# Patient Record
Sex: FEMALE | Employment: UNEMPLOYED | URBAN - METROPOLITAN AREA
[De-identification: names, ages, dates, MRNs, and addresses within clinical notes are randomized per-mention and may not be internally consistent; named-entity substitution may affect disease eponyms.]

---

## 2022-11-28 ENCOUNTER — APPOINTMENT (OUTPATIENT)
Dept: CT IMAGING | Age: 62
End: 2022-11-28
Payer: MEDICAID

## 2022-11-28 ENCOUNTER — APPOINTMENT (OUTPATIENT)
Dept: GENERAL RADIOLOGY | Age: 62
End: 2022-11-28
Payer: MEDICAID

## 2022-11-28 ENCOUNTER — HOSPITAL ENCOUNTER (EMERGENCY)
Age: 62
Discharge: ANOTHER HOSPITAL | End: 2022-11-28
Attending: EMERGENCY MEDICINE
Payer: MEDICAID

## 2022-11-28 VITALS
RESPIRATION RATE: 21 BRPM | TEMPERATURE: 99 F | HEART RATE: 95 BPM | DIASTOLIC BLOOD PRESSURE: 77 MMHG | WEIGHT: 194.45 LBS | OXYGEN SATURATION: 96 % | SYSTOLIC BLOOD PRESSURE: 181 MMHG

## 2022-11-28 DIAGNOSIS — R55 SYNCOPE AND COLLAPSE: ICD-10-CM

## 2022-11-28 DIAGNOSIS — I60.9 SUBARACHNOID HEMORRHAGE (HCC): Primary | ICD-10-CM

## 2022-11-28 LAB
A/G RATIO: 1.1 (ref 1.1–2.2)
ALBUMIN SERPL-MCNC: 4 G/DL (ref 3.4–5)
ALP BLD-CCNC: 79 U/L (ref 40–129)
ALT SERPL-CCNC: 6 U/L (ref 10–40)
ANION GAP SERPL CALCULATED.3IONS-SCNC: 17 MMOL/L (ref 3–16)
AST SERPL-CCNC: 20 U/L (ref 15–37)
BACTERIA: ABNORMAL /HPF
BASOPHILS ABSOLUTE: 0 K/UL (ref 0–0.2)
BASOPHILS RELATIVE PERCENT: 0.5 %
BILIRUB SERPL-MCNC: 0.4 MG/DL (ref 0–1)
BILIRUBIN URINE: NEGATIVE
BLOOD, URINE: ABNORMAL
BUN BLDV-MCNC: 9 MG/DL (ref 7–20)
CALCIUM SERPL-MCNC: 9.5 MG/DL (ref 8.3–10.6)
CHLORIDE BLD-SCNC: 101 MMOL/L (ref 99–110)
CLARITY: ABNORMAL
CO2: 18 MMOL/L (ref 21–32)
COLOR: YELLOW
CREAT SERPL-MCNC: 0.6 MG/DL (ref 0.6–1.2)
EKG ATRIAL RATE: 59 BPM
EKG DIAGNOSIS: NORMAL
EKG P AXIS: 136 DEGREES
EKG P-R INTERVAL: 182 MS
EKG Q-T INTERVAL: 398 MS
EKG QRS DURATION: 88 MS
EKG QTC CALCULATION (BAZETT): 394 MS
EKG R AXIS: 135 DEGREES
EKG T AXIS: 144 DEGREES
EKG VENTRICULAR RATE: 59 BPM
EOSINOPHILS ABSOLUTE: 0.1 K/UL (ref 0–0.6)
EOSINOPHILS RELATIVE PERCENT: 2.1 %
EPITHELIAL CELLS, UA: ABNORMAL /HPF (ref 0–5)
GFR SERPL CREATININE-BSD FRML MDRD: >60 ML/MIN/{1.73_M2}
GLUCOSE BLD-MCNC: 88 MG/DL (ref 70–99)
GLUCOSE URINE: NEGATIVE MG/DL
HCT VFR BLD CALC: 44.1 % (ref 36–48)
HEMOGLOBIN: 14.2 G/DL (ref 12–16)
KETONES, URINE: NEGATIVE MG/DL
LEUKOCYTE ESTERASE, URINE: NEGATIVE
LYMPHOCYTES ABSOLUTE: 2.5 K/UL (ref 1–5.1)
LYMPHOCYTES RELATIVE PERCENT: 37.5 %
MCH RBC QN AUTO: 29.5 PG (ref 26–34)
MCHC RBC AUTO-ENTMCNC: 32.2 G/DL (ref 31–36)
MCV RBC AUTO: 91.7 FL (ref 80–100)
MICROSCOPIC EXAMINATION: YES
MONOCYTES ABSOLUTE: 0.5 K/UL (ref 0–1.3)
MONOCYTES RELATIVE PERCENT: 8 %
NEUTROPHILS ABSOLUTE: 3.4 K/UL (ref 1.7–7.7)
NEUTROPHILS RELATIVE PERCENT: 51.9 %
NITRITE, URINE: POSITIVE
PDW BLD-RTO: 15.4 % (ref 12.4–15.4)
PH UA: 7 (ref 5–8)
PLATELET # BLD: 224 K/UL (ref 135–450)
PMV BLD AUTO: 7.3 FL (ref 5–10.5)
POTASSIUM REFLEX MAGNESIUM: 4 MMOL/L (ref 3.5–5.1)
PROTEIN UA: NEGATIVE MG/DL
RBC # BLD: 4.81 M/UL (ref 4–5.2)
RBC UA: ABNORMAL /HPF (ref 0–4)
SODIUM BLD-SCNC: 136 MMOL/L (ref 136–145)
SPECIFIC GRAVITY UA: 1.02 (ref 1–1.03)
TOTAL PROTEIN: 7.7 G/DL (ref 6.4–8.2)
TRICHOMONAS: ABNORMAL /HPF
TROPONIN: <0.01 NG/ML
URINE REFLEX TO CULTURE: ABNORMAL
URINE TYPE: ABNORMAL
UROBILINOGEN, URINE: 0.2 E.U./DL
WBC # BLD: 6.6 K/UL (ref 4–11)
WBC UA: ABNORMAL /HPF (ref 0–5)

## 2022-11-28 PROCEDURE — 93010 ELECTROCARDIOGRAM REPORT: CPT | Performed by: INTERNAL MEDICINE

## 2022-11-28 PROCEDURE — 36415 COLL VENOUS BLD VENIPUNCTURE: CPT

## 2022-11-28 PROCEDURE — 81001 URINALYSIS AUTO W/SCOPE: CPT

## 2022-11-28 PROCEDURE — 2580000003 HC RX 258: Performed by: EMERGENCY MEDICINE

## 2022-11-28 PROCEDURE — 85025 COMPLETE CBC W/AUTO DIFF WBC: CPT

## 2022-11-28 PROCEDURE — 70450 CT HEAD/BRAIN W/O DYE: CPT

## 2022-11-28 PROCEDURE — 80053 COMPREHEN METABOLIC PANEL: CPT

## 2022-11-28 PROCEDURE — 2500000003 HC RX 250 WO HCPCS: Performed by: EMERGENCY MEDICINE

## 2022-11-28 PROCEDURE — 84484 ASSAY OF TROPONIN QUANT: CPT

## 2022-11-28 PROCEDURE — 96365 THER/PROPH/DIAG IV INF INIT: CPT

## 2022-11-28 PROCEDURE — 93005 ELECTROCARDIOGRAM TRACING: CPT | Performed by: EMERGENCY MEDICINE

## 2022-11-28 PROCEDURE — 96375 TX/PRO/DX INJ NEW DRUG ADDON: CPT

## 2022-11-28 PROCEDURE — 99285 EMERGENCY DEPT VISIT HI MDM: CPT

## 2022-11-28 PROCEDURE — 72125 CT NECK SPINE W/O DYE: CPT

## 2022-11-28 PROCEDURE — 6360000002 HC RX W HCPCS: Performed by: EMERGENCY MEDICINE

## 2022-11-28 PROCEDURE — 71045 X-RAY EXAM CHEST 1 VIEW: CPT

## 2022-11-28 RX ORDER — LABETALOL HYDROCHLORIDE 5 MG/ML
10 INJECTION, SOLUTION INTRAVENOUS ONCE
Status: DISCONTINUED | OUTPATIENT
Start: 2022-11-28 | End: 2022-11-28 | Stop reason: HOSPADM

## 2022-11-28 RX ADMIN — LEVETIRACETAM 1000 MG: 100 INJECTION, SOLUTION INTRAVENOUS at 10:31

## 2022-11-28 RX ADMIN — NICARDIPINE HYDROCHLORIDE 5 MG/HR: 0.1 INJECTION, SOLUTION INTRAVENTRICULAR at 10:34

## 2022-11-28 ASSESSMENT — PAIN SCALES - GENERAL: PAINLEVEL_OUTOF10: 8

## 2022-11-28 ASSESSMENT — PAIN DESCRIPTION - LOCATION: LOCATION: HEAD

## 2022-11-28 NOTE — ED NOTES
Patient accepted to [facility] Dr. Joellyn Osler. Room 7111.   Report [phone]     Alina Casas) Manuel Trevino RN  11/28/22 0111

## 2022-11-28 NOTE — ED PROVIDER NOTES
[facility] ENCOUNTER      Pt Name: Leighton Smith  MRN: [id number]  [place] 1960  Date of evaluation: 11/28/2022  Provider: Adrien Quinonez DO    CHIEF COMPLAINT       Chief Complaint   Patient presents with    Loss of Consciousness     Patient states she was talking to a family member while walking down the street, her head started to hurt and she fell down. States that she did not lose consciousness that she knew of. She states she heard questions paramedics were asking but couldn't answer. HISTORY OF PRESENT ILLNESS   (Location/Symptom, Timing/Onset, Context/Setting, Quality, Duration, Modifying Factors, Severity)  Note limiting factors. Leighton Smith is a 63 y.o. female who presents to the emergency department with a complaint of a dull aching headache in the right frontal area. She states that it started earlier this morning. It was gradual in onset. She states that she was talking to a family member, walking down the street, coming from the store, when she started to feel lightheaded and fell. She states that she did not lose consciousness. However, paramedics report that she was unresponsive on their arrival.  The patient states that she remembers what was happening but she was unable to answer them. She denies any prior history of syncope, palpitations, or arrhythmia. She denies any associated chest pain heaviness pressure or tightness. She denies any shortness of breath diaphoresis or dyspnea on exertion. She denies any recent illness fever chills cough cold symptoms. She denies any abdominal pain nausea vomiting diarrhea. She denies any melena or hematochezia. She denies any personal or family history of coronary artery disease or thromboembolic disease. She smokes tobacco.  She denies any drug or alcohol use. She denies any leg or calf pain. No recent travel.     She states that she feels better now but still has some mild dull throbbing headache in the right frontal area. She currently rates it an 8/10 intensity. She denies any associated vision change, vision loss, tinnitus, vertigo, neck pain, slurred speech, difficulty speaking, or difficulty walking. She denies any focal weakness or numbness. Nursing Notes were reviewed. HPI        REVIEW OF SYSTEMS    (2-9 systems for level 4, 10 or more for level 5)       Constitutional: Negative for fever or chills. HENT: Negative for rhinorrhea and sore throat. Eyes: Negative for redness or drainage. Respiratory: Negative for shortness of breath or dyspnea on exertion. Cardiovascular: Negative for chest pain. Gastrointestinal: Negative for abdominal pain. Negative for vomiting or diarrhea. Genitourinary: Negative for flank pain. Negative for dysuria. Negative for hematuria. Musculoskeletal:  Negative edema. Hematological: Negative for adenopathy. All systems are reviewed and are negative except for those listed above in the history of present illness and ROS. PAST MEDICAL HISTORY   No past medical history on file. SURGICAL HISTORY     No past surgical history on file. CURRENT MEDICATIONS       Previous Medications    No medications on file       ALLERGIES     Patient has no known allergies. FAMILY HISTORY     No family history on file. SOCIAL HISTORY       Social History     Socioeconomic History    Marital status: Single   Tobacco Use    Smoking status: Every Day     Types: Cigarettes   Substance and Sexual Activity    Drug use: Yes     Types: Marijuana (Weed)       SCREENINGS   NIH Stroke Scale  Interval: Baseline  Level of Consciousness (1a): Alert  LOC Questions (1b): Answers both correctly  LOC Commands (1c): Performs both tasks correctly  Best Gaze (2): Normal  Visual (3): No visual loss  Facial Palsy (4): Normal symmetrical movement  Motor Arm, Left (5a): No drift  Motor Arm, Right (5b):  No drift  Motor Leg, Left (6a): No drift  Motor Leg, Right (6b): No drift  Limb Ataxia (7): Absent  Sensory (8): Normal  Best Language (9): No aphasia  Dysarthria (10): Normal  Extinction and Inattention (11): No abnormality  Total: 0Glasgow Coma Scale  Eye Opening: Spontaneous  Best Verbal Response: Oriented  Best Motor Response: Obeys commands  John Coma Scale Score: 15        PHYSICAL EXAM    (up to 7 for level 4, 8 or more for level 5)     ED Triage Vitals   BP Temp Temp src Pulse Resp SpO2 Height Weight   -- -- -- -- -- -- -- --         Physical Exam   Constitutional: Awake and alert. Very pleasant. Mild discomfort. Head: No visible evidence of trauma. Normocephalic. No greene sign. No skull tenderness or step-off. Eyes: Pupils equal and reactive. No photophobia. Conjunctiva normal.    HENT: Oral mucosa moist.  Airway patent. Pharynx without erythema. Nares were clear. No intraoral or intranasal injury. No hyphema. Neck:  Soft and supple. Nontender. No point or axial tenderness. Mild discomfort with range of motion. Heart:  Regular rate and rhythm. No murmur. Lungs:  Clear to auscultation. No wheezes, rales, or ronchi. No conversational dyspnea or accessory muscle use. Chest: Chest wall non-tender. No evidence of trauma. Abdomen:  Soft, nondistended, bowel sounds present. Nontender. No guarding rigidity or rebound. No masses. Musculoskeletal: Extremities non-tender with full range of motion. Radial and dorsalis pedis pulses were intact. No calf tenderness erythema or edema. Neurological: Alert and oriented x3. GCS 15. Cranial nerves II through XII were intact. No facial droop. No dysarthria. No aphasia. No pronator drift. No acute focal motor or sensory deficits. Negative finger-to-nose. Negative heel-to-shin. Deep tendon reflexes were 2/4 in the upper and lower extremities bilaterally. Gait steady. No visual field deficits. NIH stroke scale is 0. Skin: Skin is warm and dry. No rash. Lymphatic:  No lympadenopathy. Psychiatric: Normal mood and affect. Behavior is normal.         DIAGNOSTIC RESULTS     EKG: All EKG's are interpreted by the Emergency Department Physician who either signs or Co-signs this chart in the absence of a cardiologist.    Sinus bradycardia. Rate 59. KY interval 182 ms. QRS duration 88 ms. QTc 394 ms. R Axis I 135 degrees. There is no ST elevation. T wave inversion noted in V1. Slight J-point elevation noted in V2. No reciprocal changes. Left ventricular hypertrophy. RADIOLOGY:   Non-plain film images such as CT, Ultrasound and MRI are read by the radiologist. Plain radiographic images are visualized and preliminarily interpreted by the emergency physician with the below findings:        Interpretation per the Radiologist below, if available at the time of this note:    CTA HEAD NECK W CONTRAST         XR CHEST PORTABLE   Final Result   No radiographic evidence of acute pulmonary disease. CT CERVICAL SPINE WO CONTRAST   Final Result   No acute abnormality of the cervical spine. CT HEAD WO CONTRAST   Final Result   1. Acute subarachnoid hemorrhage. 2. There is no pattern of significant edema or mass effect at the current   time. Findings were discussed with MRAQUIS EDWARDS at 9:53 am on 11/28/2022. ED BEDSIDE ULTRASOUND:   Performed by ED Physician - none    LABS:  Labs Reviewed   COMPREHENSIVE METABOLIC PANEL W/ REFLEX TO MG FOR LOW K - Abnormal; Notable for the following components:       Result Value    CO2 18 (*)     Anion Gap 17 (*)     ALT 6 (*)     All other components within normal limits   CBC WITH AUTO DIFFERENTIAL   TROPONIN   URINALYSIS WITH REFLEX TO CULTURE   PROTIME-INR       All other labs were within normal range or not returned as of this dictation.     EMERGENCY DEPARTMENT COURSE and DIFFERENTIAL DIAGNOSIS/MDM:   Vitals:    Vitals:    11/28/22 0925 11/28/22 0945 11/28/22 1015 11/28/22 1030   BP: (!) 166/100 (!) 208/98 (!) 200/101 (!) 201/99   Pulse: 65 63 60 65   Resp: 16 15 11 15   Temp: 99 °F (37.2 °C)      TempSrc: Oral      SpO2: 95% 97% 99% 98%   Weight: 194 lb 7.1 oz (88.2 kg)            MDM        The patient presents with a syncopal episode that occurred prior to arrival while walking down the street. This was preceded by a dull gradual onset right frontal \"throbbing\" headache that began earlier this morning. She is neurologically intact. GCS is 15. NIH stroke scale is 0. She currently only complains of a slight headache. She is afebrile. She is hemodynamically stable. CT head and cervical spine were obtained. EKG reveals sinus rhythm. No acute changes. Is this patient to be included in the SEP-1 Core Measure due to severe sepsis or septic shock? No   Exclusion criteria - the patient is NOT to be included for SEP-1 Core Measure due to: Infection is not suspected      REASSESSMENT          10:01 AM: Critical CT report was called by radiology. The patient has evidence of acute subarachnoid hemorrhage. Call was placed to the transfer center to neurosurgery on-call. Given the fact that the patient's headache began prior to her fall, I do not suspect that this is traumatic in origin. CTA was added. She is NPO. She was given Keppra 1000 mg IV. She was given labetalol 10 mg IV. Blood pressure is 166/100. The transfer center spoke with Tyrone Red, nurse practitioner for [address] [place] [address] neurosurgery. Based on the nature of this patient's symptoms, she will need to go to [facility].  Call was placed to neurosurgery at [facility].    10:20 AM: Findings were discussed with the patient at the bedside and her questions were answered. Neurological exam is unchanged. She is alert and oriented x3. GCS is 15. No motor or sensory deficits. She still complains of a mild headache. No neck pain. Blood pressure however is 200/100. Cardene drip was initiated.   Titrate

## 2022-11-28 NOTE — ED NOTES
Patient arrived by [facility] who report finding patient on ground alert but not answering questions. Upon arrival to ED patient alert and oriented. Keeps eyes closed when answering questions. States she was walking the kids to school and left dizzy. States she fell to ground but does not remember hitting her head. States she did not lose consciousness. C/o pain top of head.      [facility] ([facility]) Marely Martinez RN  11/28/22 6778

## 2022-11-28 NOTE — ED NOTES
Voided 200 ml per bedpan. [employer] team here.   Report transported to [facility] Neuro Unit room [address] [facility] Yanci Love RN  11/28/22 5720

## 2022-11-28 NOTE — PLAN OF CARE
NEUROSURGERY PLAN OF CARE NOTE    Roberto Carlos Kent  [id number]   1960   11/28/2022    ED physician: Al Thompson    Reason for call: [address]    History of present illness: Patient is a 63 y.o. female that  has no past medical history on file. Patient presented on 11/28/2022 to [facility] ED. According to  [facility], the patient states the headache started earlier this morning and was gradual in onset. She states she was talking to a family member, walking down the street, coming from the store, when she started to feel lightheaded and fell. She states she did not lose consciousness, but paramedics report she was unresponsive upon their arrival.  The patient states she remembers what was happening but she was unable to answer them. She denies any prior history of syncope, palpitations, or arrhythmia. She denies any associated chest pain heaviness pressure or tightness. She denies any shortness of breath diaphoresis or dyspnea on exertion. She states she feels better now but still has some mild dull throbbing headache in the right frontal area. She currently rates it an 8/10 intensity. She denies any associated vision change, vision loss, tinnitus, vertigo, neck pain, slurred speech, difficulty speaking, or difficulty walking. She denies any focal weakness or numbness. Radiological Findings:  CT HEAD WO CONTRAST  Result Date: 11/28/2022  1. Acute subarachnoid hemorrhage. 2. There is no pattern of significant edema or mass effect at the current time. Assessment:  Patient is a 63 y.o. y/o female w/LOC and found unresponsive by EMS and continues to have 8/10 headache. Recommendations:  Transfer to [facility] Neuro ICU  Dr. Damion Hammonds from [address] [place] [address] Neurovascular Surgery notified of patient's transfer  Neurologic exams frequency:  - ICU: Q1H  For change in exam MUST contact neurosurgery team  CTA Head/Neck to look for aneurysmal etiology of SAH  Seizure prophylaxis: Keppra 1000 mg   Maintain SBP <160;  If PRN med insufficient, then may start Nicardipine infusion  Keep Plt >100k & INR <1.4    DISPO: Transfer to [facility]      Electronically signed by: FLORENTINO Blas CNP, APRN-CNP, 11/28/2022 10:09 AM  [phone]    .

## 2023-01-05 ENCOUNTER — HOSPITAL ENCOUNTER (OUTPATIENT)
Dept: PHYSICAL THERAPY | Age: 63
Setting detail: THERAPIES SERIES
Discharge: HOME | End: 2023-01-05

## 2023-01-05 NOTE — PROGRESS NOTES
Physical Therapy  Cancellation/No-show Note  Patient Name:  Bhavin Landeros  :  1960   Date:  2023  Cancelled visits to date: 0  No-shows to date: 0    For today's appointment patient:  []  Cancelled  []  Rescheduled appointment  [x]  No-show     Reason given by patient:  []  Patient ill  []  Conflicting appointment  [x]  No transportation     []  Conflict with work  []  No reason given  []  Other:     Comments:  Call placed to patient after 15 minutes, transportation conflict this date.      Electronically signed by:  Sharita Hogan, [facility], DPT [id number] 1:24 PM

## 2023-01-12 ENCOUNTER — HOSPITAL ENCOUNTER (OUTPATIENT)
Dept: PHYSICAL THERAPY | Age: 63
Setting detail: THERAPIES SERIES
Discharge: HOME | End: 2023-01-12
Payer: MEDICAID

## 2023-01-12 PROCEDURE — 97112 NEUROMUSCULAR REEDUCATION: CPT

## 2023-01-12 PROCEDURE — 97530 THERAPEUTIC ACTIVITIES: CPT

## 2023-01-12 PROCEDURE — 97161 PT EVAL LOW COMPLEX 20 MIN: CPT

## 2023-01-12 NOTE — PROGRESS NOTES
Physical Therapy    Physical Therapy: Initial Evaluation    Patient: Cammie Jones (62 y.o. female)   Examination Date: 2023  Plan of Care Certification Period:2023 to        :  1960 ;    Confirmed: Yes MRN: [id number]  CSN: [id number]   Insurance: Payor: [facility] MEDICAID / Plan: [facility] MEDICA / Product Type: *No Product type* /   Insurance ID: [id number] - (Medicaid Managed) Secondary Insurance (if applicable):    Referring Physician: Wanda Singh MD Srilakshmi Pisati, MD   PCP: Wanda Singh MD Visits to Date/Visits Approved:     No Show/Cancelled Appts:  1 /       Medical Diagnosis: Hypertension, unspecified type [I10]  Screen for colon cancer [Z12.11]  Cerebrovascular accident (CVA), unspecified mechanism (HCC) [I63.9] Cerebrovascular accident (CVA), unspecified mechanism (HCC) (I63.9)  Treatment Diagnosis: Bilateral hip weakness, balance impairments resulting in gait deficits     PERTINENT MEDICAL HISTORY   Patient Assessed for Rehabilitation Services: Yes  Self reported health status:: Fair    Medical History: Chart Reviewed: Yes  No past medical history on file.  Surgical History: No past surgical history on file.    Medications:   Current Outpatient Medications:     atorvastatin (LIPITOR) 40 MG tablet, Take 1 tablet by mouth daily, Disp: 30 tablet, Rfl: 3    acetaminophen (TYLENOL) 500 MG tablet, Take 1 tablet by mouth 4 times daily as needed for Pain, Disp: 120 tablet, Rfl: 5    NIFEdipine (PROCARDIA XL) 30 MG extended release tablet, Take 1 tablet by mouth daily, Disp: 30 tablet, Rfl: 3  Allergies: Patient has no known allergies.       SUBJECTIVE EXAMINATION     History obtained from:: Patient, Chart Review,      Family/Caregiver Present: Yes (dtr transport daugther present for transportation)    Subjective History:Onset Date: 23  Pt reports occassional pain with L hip/abdomen but only occassional, denies any currently. Pt  agreeable to PT evaluation. Additional Pertinent Hx (if applicable): \"The patient is a(n) 63year old female who was admitted for subarachnoid hemorrhage on 11/28/2022. Patient was found to have a syncopal episode after experiencing severe headache and dizziness. According to the patient she had been experiencing headache for several days prior to admission. Blood pressure in the emergency department initially was 200/100. Patient was started on IV nicardipine. CT head revealed diffuse subarachnoid hemorrhage throughout the central cerebral sulcal and basilar cisterns. Subarachnoid hemorrhage was secondary to 5 mm acomm aneurysm with noted stenosis of left distal M1. . Patient underwent [address] and transcatheter embolization of left ICA. Post-procedure patient was on IV heparin drip per Neurosurgery protocol. Patient also was treated with Keppra 500 mg twice daily for seizure prophylaxis for 5 days. Patient was administered nimodipine for potential vasospasm hospitalization and discharged with prescription however is not required to continue for full 21 days as no evidence of vasospasm and subarachnoid hemorrhage per CT head resolved. \"  Ryan Downs CNP - 12/09/2022 10:57 AM EST). . One fall ~ 2 weeks from PT OP evaluation tripping over toy, denies any injury     Previous treatments prior to current episode?:  ([facility])       Learning/Language: Learning  Does the patient/guardian have any barriers to learning?: No barriers  Will there be a co-learner?: No     Pain Screening    Pain Screening  Patient Currently in Pain: Denies    Functional Status    Dominant Hand: : Right    Social History:    Social History  Lives With: Family  Type of Home: House  Home Layout: Able to Live on Main level with bedroom/bathroom, Multi-level  Home Access: Stairs to enter without rails  Entrance Stairs - Number of Steps: 10 (first set unilateral, bilateral second sets)  Home Equipment: Angelo    Occupation/Interests:  Occupation: Unemployed  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 ([address] and patient derick live in home)    Prior Level of Function:    Independent   Self Care: Independent/No Functional Limitation  Bed Mobility: Independent/No Functional Limitation  Transfers (sit to stand): Independent/No Functional Limitation  Transfers (stand to sit):  Independent/No Functional Limitation  Walking: Required increased time, Modified Independent  Stairs: Required increased time, Modified Independent    Current Level of Function:         ADL Assistance: Independent  Homemaking Assistance: Independent  Ambulation Assistance: Independent (cane outside home community, walks 3 hills each way <-> grand kids.)  Transfer Assistance: Independent  Active : No  Mode of Transportation: Family    OBJECTIVE EXAMINATION   Restrictions:  Restrictions/Precautions: None           Review of Systems:  Vision: Within Functional Limits  Visual Deficits: Near-sighted (use to wear glasses but does not wear any more)  Hearing: Within functional limits  Overall Orientation Status: Within Functional Limits  Follows Commands: Within Functional Limits    Observations:       Mobility:   Bed mobility  Rolling to Left: Modified independent  Rolling to Right: Modified independent  Supine to Sit: Modified independent  Sit to Supine: Modified independent  Transfers  Sit to Stand: Modified independent  Stand to Sit: Modified independent    Ambulation/Gait (if applicable):  Ambulation  Surface: Level tile, Carpet  Device: No Device  Assistance: Modified Independent  Quality of Gait: occassional decreased clearance of LLE in swing phase, slight hip trendelenburg in LLE stance phase  Gait Deviations: Deviated path  Distance: 950' (6 minute walk test multiple turns), multiple short distance throughout therapy office  Comments: without LOB  Stairs/Curb  Stairs?: Yes  Stairs  # Steps : 12  Stairs Height: 6\"  Rails: Left ascending  Device: No Device  Assistance: Modified independent   Comment: Ascending/descend reciprocal pattern, slight decreasd control with descent but without incident, ascending increased effort and time especially when leading with LLE but without LOB      Neuro Screen:    Sensation      Sensation  Overall Sensation Status: WFL (light touch WNL equal bilateral)       Quality of Movement     Tone  RLE Tone: Normotonic  LLE Tone: Normotonic Motor Control  Gross Motor?: WFL         ROM:   Left AROM  Right AROM         AROM LLE (degrees)  LLE AROM : WFL    AROM RLE (degrees)  RLE AROM: WFL       Left Strength  Right Strength      Strength Other  Other: Side lying bilateral hip abduction 3+/5, supine SLR hip flexion 3+/5 bilateral  Other: Side lying bilateral hip abduction 3+/5, supine SLR hip flexion 3+/5 bilateral  Strength LLE  Strength LLE: WFL  L Hip Flexion: 3+/5  L Knee Flexion: 4/5  L Knee Extension: 4/5  L Ankle Dorsiflexion: 4+/5  L Ankle Plantar Flexion: 4+/5 Strength Other  Other: Side lying bilateral hip abduction 3+/5, supine SLR hip flexion 3+/5 bilateral  Other: Side lying bilateral hip abduction 3+/5, supine SLR hip flexion 3+/5 bilateral  Strength RLE  Strength RLE: WFL  Comment: MMT screened in sitting supported chair. R Hip Flexion: 4-/5  R Knee Flexion: 4+/5  R Knee Extension: 4+/5  R Ankle Dorsiflexion: 5/5  R Ankle Plantar flexion: 5/5     Additional Spinal Strength (if applicable): Other: Side lying bilateral hip abduction 3+/5, supine SLR hip flexion 3+/5 bilateral    Additional Finding(s) (if applicable):      Balance/Gait Assessment(s) Performed:  Maybelle Naranjo Balance Scale   1. Sitting to Standing: Able to stand without using hands and stabilize independently  2. Standing Unsupported: Able to stand safely for 2 minutes  3. Sitting with Back Unsupported but Feet Supported on Floor or on a Stool: Able to sit safely and securely for 2 minutes  4.  Standing to Sitting: Sits safely with minimal use of hands  5. Transfers: Able to transfer safely with minor use of hands  6. Standing Unsupported with Eyes Closed: Able to stand 10 seconds safely  7. Standing Unsupported with Feet Together: Able to place feet together independently and stand 1 minute safely  8. Reach Forward with Outstretched Arm While Standing: Can reach forward confidently 25 cm (10 inches)  9.  Object from Floor from a Standing Position: Able to  slipper safely and easily  10. Turning to Look Behind Over Left and Right Shoulders While Standing: Looks behind from both sides and weight shifts well  11. Turn 360 Degrees: Able to turn 360 degrees safely in 4 seconds or less  12. Place Alternate Foot on Step or Stool While Standing Unsupported: Able to stand independently and safely and complete 8 steps in 20 seconds  13. Standing Unsupported One Foot in Front: Able to place foot tandem independently and hold 30 seconds  14. Standing on One Leg: Tries to lift leg unable to hold 3 seconds but remains standing independently  Montalvo Balance Score: 53  Montalvo Balance Disability Index: 1-19%  Montalvo CMS Modifier: CI     Current Score: 53 / 56 (Date: 1/12/2023)    Interpretation of Score: Each section is scored on a 0-4 scale, 0 representing the patient's inability to perform the task and 4 representing independence. The scores of each section are added together for a total score of 56. The higher the patient's score, the more independent the patient. Any score below 45 indicates increased risk for falls. Low risk for falls (41-46), medium risk for falls (21-40), and high risk for falls (0-20). 6 Minute Walk Test     Distance: 950 ft.; 289.56 meters    Outcome Measure(s) Completed:   Not Assessed     ASSESSMENT     Impression:Assessment: Pt 64 y/o female s/p subarachnoid hemorrhage on 11/28/2022. Patient was found to have a syncopal episode after experiencing severe headache and dizziness.  According to the patient she had been experiencing headache for several days prior to admission. Blood pressure in the emergency department initially was 200/100. Patient was started on IV nicardipine. CT head revealed diffuse subarachnoid hemorrhage throughout the central cerebral sulcal and basilar cisterns. Subarachnoid hemorrhage was secondary to 5 mm acomm aneurysm with noted stenosis of left distal M1. . Patient underwent [address] and transcatheter embolization of left ICA. Patient reports some weakness with L hip, balance impairments with one fall over object at home since hospital admission. This date patient present with 53/56 COLON balance scale slight fall risk, completed 950' with 6 minute walk test, and decreased strength with bilateral hips resulting in slight gait impairments. Pt benefit from outpatient PT 1 X per week for 3-4 weeks to promote improvement of balance and gereralized BLEs strengthening to decreased risk for fall. Body Structures, Functions, Activity Limitations Requiring Skilled Therapeutic Intervention: Decreased functional mobility , Decreased strength, Decreased endurance    Statement of Medical Necessity: Physical Therapy is both indicated and medically necessary as outlined in the POC to increase the likelihood of meeting the functionally related goals stated below. Patient's Activity Tolerance: Patient tolerated evaluation without incident, Patient tolerated treatment well        Patient's rehabilitation potential/prognosis is considered to be: Good    Factors which may impact rehabilitation potential include: None  Measures taken to address barrier(s): N/A     GOALS     Patient Goal(s): \"return to normale activity, balance better\"  Short Term Goals Completed by 1-2 week Goal Status   Pt will demonstrate independent with HEP promoting improve strength and balance promoting improved functional mobility safety. New   Pt will demonstrated improved 6 minute walker test > 1000' to promote return to community ambulation. New                                                       Long Term Goals Completed by 3-4 weeks Goal Status   Montalvo balance 56/56 to promote decreased risk of falls for functional mobility tasks. New   Improve hip strength 4+/5 to improve stabilization and tolerance of ambulation community distances.  New   Ambulate at least 1500' outside over uneven surfaces to facilitate improved independence with community mobility New                                                  TREATMENT PLAN   PT Equipment Recommendations  Equipment Needed: No   Requires PT Follow-Up: Yes  Treatment Initiated : 1/12/2023    Pt. actively involved in establishing Plan of Care and Goals: Yes  Patient/ Caregiver education and instruction:Goals, Plan of Care, Orientation, Transfer Training, Gait Training, Home Exercise Program, Family Education, Equipment, Posture, Functional Mobility Training, Body mechanics, PT Role, Fall prevention strategies, Disease Specific Education HEP, benefits of continues PT for strengthening and balance           Treatment may include any combination of the following: Current Treatment Recommendations: Strengthening, Balance training, Functional mobility training, Transfer training, Endurance training, Gait training, Stair training, Neuromuscular re-education, Home exercise program, Safety education & training, Patient/Caregiver education & training, Equipment evaluation, education, & procurement     Frequency / Duration:  Patient to be seen 1 X per week for 3-4 weeks       Eval Complexity: Overall Evaluation : Low  Decision Making: Low Complexity  History: Personal Factors and/or Comorbidities Impacting POC: Low  Examination of body system(s) including body structures and functions, activity limitations, and/or participation restrictions: Low  Clinical Presentation: Low  Clinical Decision Making : Low Complexity    PT Treatment Completed:  Exercises:  Therapeutic exercise (CPT [id number])   Treatment Reasoning Exercise 1: Mini squats BLEs with counter support X 10  Exercise 2: Hip abduction knee striahgt BLEs with counter support x10 each alternating  Exercise 3: Standing marching with counter support BLEs each alteranting X 10  Exercise 4: standing hip extension with counter support X 10 each alteranting  Exercise 5: Standing hip flexion with knee straight counter support X 10 each alteranting    Limitations addressed: Strength, Balance, Activity tolerance  Limitations addressed: posture during exercises at this time, strength of bilateral hips. Therapist provided: Verbal cuing  Therapist provided: [email or web address]    Access Code: [email or web address]   1:1 Time (minutes): 15                 Neuromuscular Re-Education: (CPT [id number]) Treatment Reasoning    Neuromuscular Re-education (CPT 53836)  Neuro Re-Ed Exercise 1: Standing at counter SLS 15\" hold bilateral, no LOB but requires single UE hold. Neuro Re-Ed Exercise 2: Standing tandem stance level surface 30\" each side without UE support, slight hip sway but without lOB Limitations addressed: Balance, Posture  Therapist provided: Verbal cuing   1:1 Time (minutes): 8                 Therapy Time  Individual Time In: 1257       Individual Time Out: 1350  Minutes: 53  Timed Code Treatment Minutes: 23 Minutes     Therapist Signature: Vanita Giraldo, PT    Date: 1/12/2023   Isis Alvarez PT, DPT [id number] 53/70/02 2:01 PM    I certify that the above Therapy Services are being furnished while the patient is under my care. I agree with the treatment plan and certify that this therapy is necessary. Physician's Signature:  ___________________________   Date:_______                                                                   Zo Hough MD        Physician Comments: _______________________________________________    Please sign and return to [place] [address]. Please fax to the location listed below.  Jason [address] YOU for this referral!    [address] [facility]  [address] [place] [address]  Dept: [phone]  Loc: [phone]       POC NOTE

## 2023-01-12 NOTE — PLAN OF CARE
[facility]  [address] [address]  Dept: [phone]  Loc: [phone]      Physical Therapy Re-Certification Plan of Care/MD UPDATE      Dear Prince Rashid MD,    We had the pleasure of treating the following patient for physical therapy services at [facility]. A summary of our findings can be found in the updated assessment below. This includes our plan of care. If you have any questions or concerns regarding these findings, please do not hesitate to contact me at the office phone number checked above. Thank you for the referral.     Physician Signature:________________________________Date:__________________  By signing above (or electronic signature), therapists plan is approved by physician    Date Range Of Visits: 2023  Total Visits to Date: 1  Overall Response to Treatment:   []Patient is responding well to treatment and improvement is noted with regards  to goals   []Patient should continue to improve in reasonable time if they continue HEP   []Patient has plateaued and is no longer responding to skilled PT intervention    []Patient is getting worse and would benefit from return to referring MD   []Patient unable to adhere to initial POC   [x]Other: POC initiated, too soon to determine progress      Recommendation:    [x]Continue PT 1x / wk for 3-4 weeks.     []Hold PT, pending MD visit            PHYSICAL THERAPY PLAN OF CARE   Patient: Dalia Shin (20 y.o. female)   Examination Date:   Plan of Care Certification Period: 2023 to        :  1960  MRN: [id number]  CSN: [id number]   Insurance: Payor: Gracia Cordova / Plan: Maria Esther Arora / Product Type: *No Product type* /   Insurance ID: [id number] - (Medicaid Managed) Secondary Insurance (if applicable):    Referring Physician: MD Rolo Melendez MD   PCP: Alberto Clancy Ronda Wood MD Visits to Date/Visits Approved: 1 / 30    No Show/Cancelled Appts:   /       Medical Diagnosis: Hypertension, unspecified type [I10]  Screen for colon cancer [Z12.11]  Cerebrovascular accident (CVA), unspecified mechanism ([facility] [I63.9] Cerebrovascular accident (CVA), unspecified mechanism ([address] (I63.9)  Treatment Diagnosis: Bilateral hip weakness, balance impairments resulting in gait deficits       ASSESSMENT     Impression:Assessment: Pt 62 y/o female s/p subarachnoid hemorrhage on 11/28/2022. Patient was found to have a syncopal episode after experiencing severe headache and dizziness. According to the patient she had been experiencing headache for several days prior to admission. Blood pressure in the emergency department initially was 200/100. Patient was started on IV nicardipine. CT head revealed diffuse subarachnoid hemorrhage throughout the central cerebral sulcal and basilar cisterns. Subarachnoid hemorrhage was secondary to 5 mm acomm aneurysm with noted stenosis of left distal M1. . Patient underwent [address] and transcatheter embolization of left ICA. Patient reports some weakness with L hip, balance impairments with one fall over object at home since hospital admission. This date patient present with 53/56 COLON balance scale slight fall risk, completed 950' with 6 minute walk test, and decreased strength with bilateral hips resulting in slight gait impairments. Pt benefit from outpatient PT 1 X per week for 3-4 weeks to promote improvement of balance and gereralized BLEs strengthening to decreased risk for fall. Body Structures, Functions, Activity Limitations Requiring Skilled Therapeutic Intervention: Decreased functional mobility , Decreased strength, Decreased endurance    Statement of Medical Necessity: Physical Therapy is both indicated and medically necessary as outlined in the POC to increase the likelihood of meeting the functionally related goals stated below.      Patient's Activity Tolerance: Patient tolerated evaluation without incident, Patient tolerated treatment well        Patient's rehabilitation potential/prognosis is considered to be: Good    Factors which may impact rehabilitation potential include: None  Measures taken to address barrier(s): N/A     GOALS     Patient Goal(s): \"return to normale activity, balance better\"  Short Term Goals Completed by 1-2 week Goal Status   Pt will demonstrate independent with HEP promoting improve strength and balance promoting improved functional mobility safety. New   Pt will demonstrated improved 6 minute walker test > 1000' to promote return to community ambulation. New                                                       Long Term Goals Completed by 3-4 weeks Goal Status   Montalvo balance 56/56 to promote decreased risk of falls for functional mobility tasks. New   Improve hip strength 4+/5 to improve stabilization and tolerance of ambulation community distances.  New   Ambulate at least 1500' outside over uneven surfaces to facilitate improved independence with community mobility New                                                  TREATMENT PLAN   PT Equipment Recommendations  Equipment Needed: No   Requires PT Follow-Up: Yes  Treatment Initiated : 1/12/2023    Pt. actively involved in establishing Plan of Care and Goals: Yes  Patient/ Caregiver education and instruction:Goals, Plan of Care, Orientation, Transfer Training, Gait Training, Home Exercise Program, Family Education, Equipment, Posture, Functional Mobility Training, Body mechanics, PT Role, Fall prevention strategies, Disease Specific Education HEP, benefits of continues PT for strengthening and balance           Treatment may include any combination of the following: Current Treatment Recommendations: Strengthening, Balance training, Functional mobility training, Transfer training, Endurance training, Gait training, Stair training, Neuromuscular re-education, Home exercise program, Safety education & training, Patient/Caregiver education & training, Equipment evaluation, education, & procurement     Frequency / Duration:  Patient to be seen 1 X per week for 3-4 weeks       Patient Status: [x] Continue / Initiate Plan of Care     Signature: Electronically signed by Yariel Longoria, PT on 1/12/2023 at 2:19 PM.  Dex Mcclelland PT, DPT [id number] 01/12/23 2:20 PM       If you have any questions or concerns, please don't hesitate to call.   Thank you for your referral!

## 2023-01-19 ENCOUNTER — HOSPITAL ENCOUNTER (OUTPATIENT)
Dept: PHYSICAL THERAPY | Age: 63
Setting detail: THERAPIES SERIES
Discharge: HOME | End: 2023-01-19
Payer: MEDICAID

## 2023-01-19 PROCEDURE — 97530 THERAPEUTIC ACTIVITIES: CPT

## 2023-01-19 NOTE — PROGRESS NOTES
Physical Therapy  Cancellation/No-show Note  Patient Name:  Jaswant Preston  :  1960   Date:  2023  Cancelled visits to date: 0  No-shows to date: 2    For today's appointment patient:  []  Cancelled  []  Rescheduled appointment  [x]  No-show     Reason given by patient:  [x]  Patient ill  []  Conflicting appointment  []  No transportation    []  Conflict with work  []  No reason given  []  Other:     Comments:  Called patient after 15 minutes no show, states that she is ill this date.  Plans to attend next scheduled appointment  at 1 pm.     Electronically signed by:  Amber Cantor, [address] [place] [address] PT, DPT [id number] 23 1:24 PM

## 2023-01-26 ENCOUNTER — HOSPITAL ENCOUNTER (OUTPATIENT)
Dept: PHYSICAL THERAPY | Age: 63
Setting detail: THERAPIES SERIES
Discharge: HOME | End: 2023-01-26
Payer: MEDICAID

## 2023-01-26 NOTE — PROGRESS NOTES
Physical Therapy  Cancellation/No-show Note  Patient Name:  Nereyda Rangel  :  1960   Date:  2023  Cancelled visits to date: 1  No-shows to date: 2    For today's appointment patient:  [x]  Cancelled  []  Rescheduled appointment  []  No-show     Reason given by patient:  [x]  Patient ill  []  Conflicting appointment  []  No transportation    []  Conflict with work  []  No reason given  []  Other:     Comments:  Pt reports ill with \"pneumonia or some kind of pneumonia\". Pt clarified that she does want to participate in therapy. Educated on importance of participating with one additional attempt offered.      Electronically signed by:  Cristiana Combs [address] [place] [address], DPT [id number] 23 12:15 PM

## 2023-02-02 ENCOUNTER — HOSPITAL ENCOUNTER (OUTPATIENT)
Dept: PHYSICAL THERAPY | Age: 63
Setting detail: THERAPIES SERIES
Discharge: HOME | End: 2023-02-02
Payer: MEDICAID

## 2023-02-02 PROCEDURE — 97110 THERAPEUTIC EXERCISES: CPT

## 2023-02-02 PROCEDURE — 97112 NEUROMUSCULAR REEDUCATION: CPT

## 2023-02-02 NOTE — PROGRESS NOTES
Physical Therapy: Daily Note   Patient: Sohan Hopkins (70 y.o. female)   Examination Date: 2023  No data recorded  No data recorded   :  1960 # of Visits since [facility]:   3   MRN: [id number]  CSN: [id number] Start of Care Date:   2023   Insurance: Payor: [employer] / Plan: Davon Crocsleeann / Product Type: *No Product type* /   Insurance ID: [id number] - (Medicaid Managed) Secondary Insurance (if applicable):    Referring Physician: MD Donya Soares MD   PCP: Donya Gómez MD Visits to Date/Visits Approved:     No Show/Cancelled Appts:          Medical Diagnosis: Hypertension, unspecified type [I10]  Screen for colon cancer [Z12.11]  Cerebrovascular accident (CVA), unspecified mechanism ([address] [I63.9] Cerebrovascular accident (CVA), unspecified mechanism ([address] (I63.9)  Treatment Diagnosis: Bilateral hip weakness, balance impairments resulting in gait deficits        SUBJECTIVE EXAMINATION   Pain Level:      Patient Comments: Subjective: Pt reports no pain this date, reports improved activity tolerance this date. No new concerns but feeling stronger. Pt agreeable to PT treatment session.     HEP Compliance: Good        OBJECTIVE EXAMINATION   Restrictions:  Restrictions/Precautions: None   No data recorded No data recorded              TREATMENT     Exercises:  Therapeutic exercise (CPT 28438)   Treatment Reasoning    Exercise 1: Mini squats BLEs with counter support X 15 standing blue airex pad  Exercise 2: Hip abduction knee striahgt BLEs with counter support x15 each alternating standing blue airex  Exercise 3: Standing marching with counter support BLEs each alteranting X 15 standing on blue airex pad  Exercise 4: standing hip extension with counter support X 12 each alteranting blue airex pad  Exercise 5: Standing hip flexion with knee straight counter support X 12 each alteranting Blue airex pad  Exercise 6: Nustep 6 minutes, level 4 resistance, seat 11, BUE 8, average SPM 50. Pt tolerated well. Exercise 7: Forward/backward steps up leading with LUE X 10 and repeated RUE X 10 4\" step stool with SBA for strengthening  Exercise 8: Lateral step ups X 10 each LE 4\" step stool with SBA  Upper Extremity Function  NDT Treatment: Gait , Standing Limitations addressed: Strength, Balance, Activity tolerance  Limitations addressed: posture during exercises at this time, strength of bilateral hips. Therapist provided: Verbal cuing  Therapist provided: [email or web address]    Access Code: [email or web address] (reviewed today with improvement); new exercise program Access Code: [email or web address]  Progressed: Resistance, Repetitions, Complexity of movement   1:1 Time (minutes): 25                 Neuromuscular Re-Education: (CPT 53803) Treatment Reasoning    Neuromuscular Education  NDT Treatment: Gait , Standing  Neuromuscular Re-education (CPT S1957087)  Neuro Re-Ed Exercise 1: Standing at counter SLS on blue airex 15\" hold without UE; 20\" hold with unilateral UE support; 30\" hold with unilateral hold performed bilateral LEs increased participation (first trial multiple attempts to adjust to uneven surface with SBA) , no LOB but requires single UE hold. Neuro Re-Ed Exercise 2: Standing tandem stance unlevel surface blue airex 60\" each side without UE support reaching out lateral waist height 8 time each direction, slight hip sway but without lOB. Neuro Re-Ed Exercise 3:  Forward/backward 6' each direction tandem walking X 5 each direction with SBA, hip sway without UE slight high guard of BUE to steady self Limitations addressed: Balance, Posture  Therapist provided: Verbal cuing  Therapist provided: VC for posture and positioning  Progressed: Complexity of movement  Functional ability(s) targeted: balance, SLS transitional movements with ambulation   1:1 Time (minutes): 15                 Pt Education: PT Education: Goals, Plan of Care, Orientation, Transfer Training, Gait Training, Home Exercise Program, Family Education, Equipment, Posture, Functional Mobility Training, Body mechanics, PT Role, Fall prevention strategies, Disease Specific Education  Patient Education: HEP, benefits of continues PT for strengthening and balance; progression with therapy       ASSESSMENT     Assessment: Assessment: Pt with increased activity tolerance demonstrating improved balance and strength this date. Tolerated on progression for exercises on uneven surfaces with increased reps of each intervention at this time. Pt very motivated to participate with therapy at this time, participates well with HEP promoting return to PLOF. Pt benefit from outpatient PT 1 X per week for additional 1-2 weeks to promote improvement of balance and gereralized BLEs strengthening to decreased risk for fall. Body Structures, Functions, Activity Limitations Requiring Skilled Therapeutic Intervention: Decreased functional mobility , Decreased strength, Decreased endurance    Post-Treatment Pain Level: Activity Tolerance: Patient tolerated evaluation without incident; Patient tolerated treatment well    Therapy Prognosis: Good       GOALS   Patient Goals : \"return to normale activity, balance better\"  Short Term Goals Completed by 1-2 week Current Status Goal Status   Pt will demonstrate independent with HEP promoting improve strength and balance promoting improved functional mobility safety. Met   Pt will demonstrated improved 6 minute walker test > 1000' to promote return to community ambulation. New                                                                       Long Term Goals Completed by 3-4 weeks Current Status Goal Status   Montalvo balance 56/56 to promote decreased risk of falls for functional mobility tasks. New   Improve hip strength 4+/5 to improve stabilization and tolerance of ambulation community distances.    New   Ambulate at least 1500' outside over uneven surfaces to facilitate improved independence with community mobility   New                                                                TREATMENT PLAN   PT Equipment Recommendations  Equipment Needed: No  Plan Frequency: 1 X per week  Plan weeks: 3-4  Current Treatment Recommendations: Strengthening, Balance training, Functional mobility training, Transfer training, Endurance training, Gait training, Stair training, Neuromuscular re-education, Home exercise program, Safety education & training, Patient/Caregiver education & training, Equipment evaluation, education, & procurement           Therapy Time  Individual Time In: 5540       Individual Time Out: 1030  Minutes: 40  Timed Code Treatment Minutes: 40 Minutes     Electronically signed by Abelardo Macdonald PT  on 2/2/2023 at 10:38 AM   POC NOTE  Alexey Yung PT, DPT [id number] 02/02/23 10:38 AM

## 2023-02-09 ENCOUNTER — HOSPITAL ENCOUNTER (OUTPATIENT)
Dept: PHYSICAL THERAPY | Age: 63
Setting detail: THERAPIES SERIES
Discharge: HOME | End: 2023-02-09
Payer: MEDICAID

## 2023-02-09 PROCEDURE — 97110 THERAPEUTIC EXERCISES: CPT

## 2023-02-09 PROCEDURE — 97112 NEUROMUSCULAR REEDUCATION: CPT

## 2023-02-09 NOTE — PROGRESS NOTES
Physical Therapy: Daily Note/ Discharge Summary    Patient: Marvin Gaitan (89 y.o. female)   Examination Date: 2023  No data recorded  No data recorded   :  1960 # of Visits since [facility]:   4   MRN: [id number]  CSN: [id number] Start of Care Date:   2023   Insurance: Payor: [facility] / Plan: [facility] / Product Type: *No Product type* /   Insurance ID: [id number] - (Medicaid Managed) Secondary Insurance (if applicable):    Referring Physician: MD Yohana Gonzalez MD   PCP: Yohana Oliveira MD Visits to Date/Visits Approved: 3 / 30    No Show/Cancelled Appts:      Medical Diagnosis: Hypertension, unspecified type [I10]  Screen for colon cancer [Z12.11]  Cerebrovascular accident (CVA), unspecified mechanism ([address] [I63.9] Cerebrovascular accident (CVA), unspecified mechanism ([address] (I63.9)  Treatment Diagnosis: Bilateral hip weakness, balance impairments resulting in gait deficits        SUBJECTIVE EXAMINATION   Pain Level:  0/10     Patient Comments: Subjective: Pt reports no pain this date, reports improved activity tolerance this date. No new concerns but feeling stronger, states feeling like she may not need any further PT at this time. will complete reassessment this date. Pt agreeable to PT treatment session.     HEP Compliance: Good        OBJECTIVE EXAMINATION   Restrictions:  Restrictions/Precautions: None   No data recorded No data recorded           Left Strength  Right Strength          HIp abduction 4+/5 at this time      HIp abduction 4+/5 at this time        TREATMENT     Exercises:  Therapeutic exercise (CPT 90854)   Treatment Reasoning    Exercise 1: Mini squats BLEs with counter support X 15 standing blue airex pad  Exercise 2: Hip abduction knee striahgt BLEs with counter support x15 each alternating standing blue airex  Exercise 3: Standing marching with counter support BLEs each alteranting X 15 standing on blue airex pad  Exercise 4: standing hip extension with counter support X 12 each alteranting blue airex pad  Exercise 5: Lateral walking with green thera band around ankles 6' each direction X 4 trials  Exercise 6: Nustep 6 minutes, level 4 resistance, seat 10, BUE 10, average SPM 38. Pt tolerated well. Exercise 7: Forward/backward steps up leading with LUE X 10 and repeated RUE X 10 4\" step stool with SBA for strengthening with opposite LE performing step through with marching motion maintain SLS on 4\" step stool  Exercise 8: Lateral step ups X 12 each LE 4\" step stool mod I  Exercise 9: 6 minute walk test increased strength and tolerance with reassessment 1,100 ft with mutliple turns    Limitations addressed: Strength, Balance, Activity tolerance  Limitations addressed: posture during exercises at this time, strength of bilateral hips. Therapist provided: Verbal cuing  Progressed: Resistance, Repetitions, Complexity of movement   1:1 Time (minutes): 32                 Neuromuscular Re-Education: (CPT 02248) Treatment Reasoning    Neuromuscular Re-education (CPT 81693)  Neuro Re-Ed Exercise 4: Colon balance assessment 56/56 this date Limitations addressed: Balance, Posture  Therapist provided: Verbal cuing  Therapist provided: VC for posture and positioning  Progressed: Complexity of movement  Functional ability(s) targeted: balance, SLS transitional movements with ambulation   1:1 Time (minutes): 10                 Pt Education: PT Education: Goals, Plan of Care, Orientation, Transfer Training, Gait Training, Home Exercise Program, Family Education, Equipment, Posture, Functional Mobility Training, Body mechanics, PT Role, Fall prevention strategies, Disease Specific Education  Patient Education: discharge with continues progression/maintaining HEP       ASSESSMENT     Assessment: Assessment: Pt met 2/2 STG and 2/3 LTG this date progressing well including COLON 56/56 at this time demontrating very low fall risk. Improved 6 minute walk test 1100 ft with decreased deficits progressing well towards goals. Pt demonstrated improve strength progressing well enough for discharge from outpatient PT at this time. REceptive, participated very well with HEP throughout visit. No Further PT indicated at this time. Body Structures, Functions, Activity Limitations Requiring Skilled Therapeutic Intervention: Decreased functional mobility , Decreased strength, Decreased endurance    Post-Treatment Pain Level: Activity Tolerance: Patient tolerated evaluation without incident; Patient tolerated treatment well    Therapy Prognosis: Good       GOALS   Patient Goals : \"return to normale activity, balance better\"  Short Term Goals Completed by 1-2 week Current Status Goal Status   Pt will demonstrate independent with HEP promoting improve strength and balance promoting improved functional mobility safety. Met   Pt will demonstrated improved 6 minute walker test > 1000' to promote return to community ambulation. Met                                                                       Long Term Goals Completed by 3-4 weeks Current Status Goal Status   Montalvo balance 56/56 to promote decreased risk of falls for functional mobility tasks. Met   Improve hip strength 4+/5 to improve stabilization and tolerance of ambulation community distances.    Met   Ambulate at least 1500' outside over uneven surfaces to facilitate improved independence with community mobility   Partially met                                                                TREATMENT PLAN   Plan Frequency: 1 X per week  Plan weeks: 3-4  Current Treatment Recommendations: Strengthening, Balance training, Functional mobility training, Transfer training, Endurance training, Gait training, Stair training, Neuromuscular re-education, Home exercise program, Safety education & training, Patient/Caregiver education & training, Equipment evaluation, education, & procurement Therapy Time  Individual Time In: 1025       Individual Time Out: 1030  Minutes: 42  Timed Code Treatment Minutes: 42 Minutes     Electronically signed by Jessica Raymond PT  on 2/9/2023 at 10:39 AM   [address] [place], DPT [id number] 02/09/23 10:41 AM

## 2023-02-09 NOTE — PLAN OF CARE
[facility]  [address] [address]  Dept: [phone]  Loc: [phone]  Dear Dr. Lisa Pisano,     We had the pleasure of treating the following patient for physical therapy services at [address] [place] [address]. A summary of our findings can be found in the discharge summary below. This includes our final assessment. If you have any questions or concerns regarding these findings, please do not hesitate to contact me at the office phone number checked above. Thank you for the referral.         Physician Signature:________________________________Date:__________________  By signing above, therapists plan is approved by physician    PHYSICAL THERAPY PLAN OF CARE: DISCHARGE    Patient: Jan Boggs (77 y.o. female)   Examination Date: 07/10/5288  Plan of Care Certification Period: 2023 to        :  1960  MRN: [id number]  CSN: [id number]   Insurance: Payor: Michelle Zhao / Plan: Jorgito Hanley / Product Type: *No Product type* /   Insurance ID: [id number] - (Medicaid Managed) Secondary Insurance (if applicable):    Referring Physician: MD Evelyn Johnston MD   PCP: Evelyn Chapa MD Visits to Date/Visits Approved: 3 / 30    No Show/Cancelled Appts: 1 / 2     Medical Diagnosis: Hypertension, unspecified type [I10]  Screen for colon cancer [Z12.11]  Cerebrovascular accident (CVA), unspecified mechanism ([address] [I63.9] Cerebrovascular accident (CVA), unspecified mechanism ([address] (I63.9)  Treatment Diagnosis: Bilateral hip weakness, balance impairments resulting in gait deficits       ASSESSMENT     Assessment: Assessment: Pt met 2/2 STG and 2/3 LTG this date progressing well including COLON 56/56 at this time demontrating very low fall risk. Improved 6 minute walk test 1100 ft with decreased deficits progressing well towards goals.  Pt demonstrated improve strength progressing well enough for discharge from outpatient PT at this time. REceptive, participated very well with HEP throughout visit. No Further PT indicated at this time. Body Structures, Functions, Activity Limitations Requiring Skilled Therapeutic Intervention: Decreased functional mobility , Decreased strength, Decreased endurance    Post-Treatment Pain Level: Activity Tolerance: Patient tolerated evaluation without incident; Patient tolerated treatment well    Therapy Prognosis: Good       GOALS   Patient Goals : \"return to normale activity, balance better\"  Short Term Goals Completed by 1-2 week Current Status Goal Status   Pt will demonstrate independent with HEP promoting improve strength and balance promoting improved functional mobility safety. Met   Pt will demonstrated improved 6 minute walker test > 1000' to promote return to community ambulation. Met                                                                       Long Term Goals Completed by 3-4 weeks Current Status Goal Status   Montalvo balance 56/56 to promote decreased risk of falls for functional mobility tasks. Met   Improve hip strength 4+/5 to improve stabilization and tolerance of ambulation community distances. Met   Ambulate at least 1500' outside over uneven surfaces to facilitate improved independence with community mobility   Partially met                                                                TREATMENT PLAN   Plan Frequency: 1 X per week  Plan weeks: 3-4  Current Treatment Recommendations: Strengthening, Balance training, Functional mobility training, Transfer training, Endurance training, Gait training, Stair training, Neuromuscular re-education, Home exercise program, Safety education & training, Patient/Caregiver education & training, Equipment evaluation, education, & procurement          Patient Status: [x] Discharge PT. Recommend patient continue with HEP.      Signature: Electronically signed by Darylene Albino, PT on 2/9/2023 at 11:09 AM.     If you have any questions or concerns, please don't hesitate to call.   Thank you for your referral!  Maritza Granados PT, DPT [id number] 02/09/23 11:10 AM

## 2023-02-16 ENCOUNTER — APPOINTMENT (OUTPATIENT)
Dept: PHYSICAL THERAPY | Age: 63
End: 2023-02-16
Payer: MEDICAID

## 2023-02-23 ENCOUNTER — APPOINTMENT (OUTPATIENT)
Dept: PHYSICAL THERAPY | Age: 63
End: 2023-02-23
Payer: MEDICAID

## 2024-09-24 ENCOUNTER — HOSPITAL ENCOUNTER (OUTPATIENT)
Dept: PHYSICAL THERAPY | Age: 64
Setting detail: THERAPIES SERIES
Discharge: HOME | End: 2024-09-24
Attending: INTERNAL MEDICINE
Payer: MEDICAID

## 2024-09-24 DIAGNOSIS — R26.89 DECREASED FUNCTIONAL MOBILITY: ICD-10-CM

## 2024-09-24 DIAGNOSIS — M25.60 STIFFNESS IN JOINT: ICD-10-CM

## 2024-09-24 DIAGNOSIS — R52 PAIN: Primary | ICD-10-CM

## 2024-09-24 PROCEDURE — 97161 PT EVAL LOW COMPLEX 20 MIN: CPT

## 2024-09-24 PROCEDURE — 97110 THERAPEUTIC EXERCISES: CPT

## 2024-09-26 ENCOUNTER — HOSPITAL ENCOUNTER (OUTPATIENT)
Dept: PHYSICAL THERAPY | Age: 64
Setting detail: THERAPIES SERIES
End: 2024-09-26
Attending: INTERNAL MEDICINE
Payer: MEDICAID

## 2024-10-01 ENCOUNTER — HOSPITAL ENCOUNTER (OUTPATIENT)
Dept: PHYSICAL THERAPY | Age: 64
Setting detail: THERAPIES SERIES
Discharge: HOME | End: 2024-10-01
Attending: INTERNAL MEDICINE
Payer: MEDICAID

## 2024-10-01 PROCEDURE — 97112 NEUROMUSCULAR REEDUCATION: CPT

## 2024-10-01 PROCEDURE — 97110 THERAPEUTIC EXERCISES: CPT

## 2024-10-01 NOTE — FLOWSHEET NOTE
*       [facility]- Outpatient Rehabilitation and Therapy [address] [place] [address] office: [phone] fax: [phone]       Physical Therapy: TREATMENT/PROGRESS NOTE   Patient: Cammie Jones (64 y.o. female)   Examination Date: 10/01/2024   :  1960 MRN: [id number]   Visit #: 2   Insurance Allowable Auth Needed   [place]aines [x]Yes    []No    Insurance: Payor: [employer] [place] [employer] / Plan: [employer] [place] MEDICA / Product Type: *No Product type* /   Insurance ID: [id number] - (Medicaid Managed)  Secondary Insurance (if applicable):    Treatment Diagnosis:     ICD-10-CM    1. Pain  R52       2. Stiffness in joint  M25.60       3. Decreased functional mobility  R26.89          Medical Diagnosis:  Pain in both lower extremities [M79.604, M79.605]  Back pain, unspecified back location, unspecified back pain laterality, unspecified chronicity [M54.9]   Referring Physician: Wanda Singh MD  PCP: Wanda Singh MD     Plan of care signed (Y/N): sent     Date of Patient follow up with Physician:      Plan of Care Report: NO  POC update due: (10 visits /OR AUTH LIMITS, whichever is less)  10/31/2024                                             Medical History:  Comorbidities:  Hypertension  Stroke/TIA  Relevant Medical History: Prior [facility] 2022                                         Precautions/ Contra-indications:           Latex allergy:  NO  Pacemaker:    NO  Contraindications for Manipulation: None and NA  Date of Surgery: NA  Other:    Red Flags:  None    Suicide Screening:   The patient did not verbalize a primary behavioral concern, suicidal ideation, suicidal intent, or demonstrate suicidal behaviors.    Preferred Language for Healthcare:   [x] English       [] other:    SUBJECTIVE EXAMINATION     Seen by Dr. Singh  - referred for lumbar xray .    Patient stated complaint:  Pain in back, both legs and feet.- for the past 1- 2 months -  notes her legs

## 2024-10-03 ENCOUNTER — HOSPITAL ENCOUNTER (OUTPATIENT)
Dept: PHYSICAL THERAPY | Age: 64
Setting detail: THERAPIES SERIES
Discharge: HOME | End: 2024-10-03
Attending: INTERNAL MEDICINE
Payer: MEDICAID

## 2024-10-03 PROCEDURE — 97112 NEUROMUSCULAR REEDUCATION: CPT

## 2024-10-03 PROCEDURE — 97110 THERAPEUTIC EXERCISES: CPT

## 2024-10-03 NOTE — FLOWSHEET NOTE
*       [facility]- Outpatient Rehabilitation and Therapy [address] [place] [address] office: [phone] fax: [phone]       Physical Therapy: TREATMENT/PROGRESS NOTE   Patient: Cammie Jones (64 y.o. female)   Examination Date: 10/03/2024   :  1960 MRN: [id number]   Visit #: 3   Insurance Allowable Auth Needed   [place] [x]Yes    []No    Insurance: Payor: [employer] [place] [employer]ICA[employer] / Plan: [employer] [place] MEDICA / Product Type: *No Product type* /   Insurance ID: [id number] - (Medicaid Managed)  Secondary Insurance (if applicable):    Treatment Diagnosis:     ICD-10-CM    1. Pain  R52       2. Stiffness in joint  M25.60       3. Decreased functional mobility  R26.89          Medical Diagnosis:  Pain in both lower extremities [M79.604, M79.605]  Back pain, unspecified back location, unspecified back pain laterality, unspecified chronicity [M54.9]   Referring Physician: Wanda Singh MD  PCP: Wanda Singh MD     Plan of care signed (Y/N): sent     Date of Patient follow up with Physician:      Plan of Care Report: NO  POC update due: (10 visits /OR AUTH LIMITS, whichever is less)  2024                                             Medical History:  Comorbidities:  Hypertension  Stroke/TIA  Relevant Medical History: Prior SAH 2022                                         Precautions/ Contra-indications:           Latex allergy:  NO  Pacemaker:    NO  Contraindications for Manipulation: None and NA  Date of Surgery: NA  Other:    Red Flags:  None    Suicide Screening:   The patient did not verbalize a primary behavioral concern, suicidal ideation, suicidal intent, or demonstrate suicidal behaviors.    Preferred Language for Healthcare:   [x] English       [] other:    SUBJECTIVE EXAMINATION     Seen by Dr. Singh  - referred for lumbar xray .    Patient stated complaint:  Pain in back, both legs and feet.- for the past 1- 2 months -  notes her legs

## 2024-10-08 ENCOUNTER — HOSPITAL ENCOUNTER (OUTPATIENT)
Dept: GENERAL RADIOLOGY | Age: 64
Discharge: HOME | End: 2024-10-08
Attending: INTERNAL MEDICINE
Payer: MEDICAID

## 2024-10-08 ENCOUNTER — HOSPITAL ENCOUNTER (OUTPATIENT)
Dept: PHYSICAL THERAPY | Age: 64
Setting detail: THERAPIES SERIES
Discharge: HOME | End: 2024-10-08
Attending: INTERNAL MEDICINE
Payer: MEDICAID

## 2024-10-08 ENCOUNTER — HOSPITAL ENCOUNTER (OUTPATIENT)
Age: 64
Discharge: HOME | End: 2024-10-08
Payer: MEDICAID

## 2024-10-08 DIAGNOSIS — M54.9 BACK PAIN, UNSPECIFIED BACK LOCATION, UNSPECIFIED BACK PAIN LATERALITY, UNSPECIFIED CHRONICITY: ICD-10-CM

## 2024-10-08 DIAGNOSIS — M79.605 PAIN IN BOTH LOWER EXTREMITIES: ICD-10-CM

## 2024-10-08 DIAGNOSIS — M79.604 PAIN IN BOTH LOWER EXTREMITIES: ICD-10-CM

## 2024-10-08 PROCEDURE — 72110 X-RAY EXAM L-2 SPINE 4/>VWS: CPT

## 2024-10-08 PROCEDURE — 97110 THERAPEUTIC EXERCISES: CPT

## 2024-10-08 PROCEDURE — 72100 X-RAY EXAM L-S SPINE 2/3 VWS: CPT

## 2024-10-08 NOTE — FLOWSHEET NOTE
(92056)    Group Therapy (38992)     Estim Attended (51063)    Canalith Repositioning (45251)     Physical Performance Test (39814)         Other:    Other:    Total Timed Code Tx Minutes  3       Total Treatment Minutes 45 + 10        Charge Justification:  (47294) THERAPEUTIC EXERCISE - Provided verbal/tactile cueing for activities related to strengthening, flexibility, endurance, ROM performed to prevent loss of range of motion, maintain or improve muscular strength or increase flexibility, following either an injury or surgery.   (23343) NEUROMUSCULAR RE-EDUCATION - Therapeutic procedure, 1 or more areas, each 15 minutes; neuromuscular reeducation of movement, balance, coordination, kinesthetic sense, posture, and/or proprioception for sitting and/or standing activities    GOALS     Patient stated goal: to be able to stand and walk without pain  [] Progressing: [] Met: [] Not Met: [] Adjusted    Therapist goals for Patient:   Short Term Goals: To be achieved in: 2 weeks  1. Independent in HEP and progression per patient tolerance, in order to prevent re-injury.   [] Progressing: [] Met: [] Not Met: [] Adjusted  2. Patient will have a decrease in pain to <2/10 to facilitate improvement in movement, function, and ADLs as indicated by Functional Deficits.  [] Progressing: [] Met: [] Not Met: [] Adjusted    Long Term Goals: To be achieved in: 8 weeks  1. Disability index score of 10% or less for the Modified Oswestry to assist with reaching prior level of function with activities such as homemaking.  [] Progressing: [] Met: [] Not Met: [] Adjusted  2. Patient will demonstrate increased AROM of LS to WFL without pain to allow for proper joint functioning to enable patient to perform childcare.   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   [] Progressing: [] Met: [] Not Met: [] Adjusted  4. Be able to play with grandkids without

## 2024-10-10 ENCOUNTER — APPOINTMENT (OUTPATIENT)
Dept: PHYSICAL THERAPY | Age: 64
End: 2024-10-10
Attending: INTERNAL MEDICINE
Payer: MEDICAID

## 2024-10-15 ENCOUNTER — HOSPITAL ENCOUNTER (OUTPATIENT)
Dept: PHYSICAL THERAPY | Age: 64
Setting detail: THERAPIES SERIES
Discharge: HOME | End: 2024-10-15
Attending: INTERNAL MEDICINE
Payer: MEDICAID

## 2024-10-15 PROCEDURE — 97112 NEUROMUSCULAR REEDUCATION: CPT

## 2024-10-15 PROCEDURE — 97110 THERAPEUTIC EXERCISES: CPT

## 2024-10-15 NOTE — FLOWSHEET NOTE
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   [] Progressing: [] Met: [] Not Met: [] Adjusted  4. Be able to play with grandkids without pain(patient specific functional goal)    [] Progressing: [] Met: [] Not Met: [] Adjusted     Overall Progression Towards Functional goals/ Treatment Progress Update:  [] Patient is progressing as expected towards functional goals listed.    [] Progression is slowed due to complexities/Impairments listed.  [] Progression has been slowed due to co-morbidities.  [x] Plan just implemented, too soon (<30days) to assess goals progression   [] Goals require adjustment due to lack of progress  [] Patient is not progressing as expected and requires additional follow up with physician  [] Other:     TREATMENT PLAN     Frequency/Duration: 2x/week for 8 weeks for the following treatment interventions:    Interventions:  Therapeutic Exercise (64468) including: strength training, ROM, and functional mobility  Therapeutic Activities (75135) including: functional mobility training and education.  Neuromuscular Re-education (09536) activation and proprioception, including postural re-education.    Manual Therapy (97753) as indicated to include: Soft Tissue Mobilization and Myofascial Release  Modalities as needed that may include: Cryotherapy and Thermal Agents    Plan: Cont POC- Continue emphasis/focus on exercise progression and improving proper muscle recruitment and activation/motor control patterns. Next visit plan to progress per flowsheet  with an emphasis on TA/core stability and postural stabilization    Electronically Signed by April Hernandez PT  DPT  Date: 10/15/2024     Note: Portions of this note have been templated and/or copied from initial evaluation, reassessments and prior notes for documentation

## 2024-10-17 ENCOUNTER — HOSPITAL ENCOUNTER (OUTPATIENT)
Dept: PHYSICAL THERAPY | Age: 64
Setting detail: THERAPIES SERIES
Discharge: HOME | End: 2024-10-17
Attending: INTERNAL MEDICINE
Payer: MEDICAID

## 2024-10-17 PROCEDURE — 97112 NEUROMUSCULAR REEDUCATION: CPT

## 2024-10-17 PROCEDURE — 97110 THERAPEUTIC EXERCISES: CPT

## 2024-10-17 NOTE — FLOWSHEET NOTE
*       [facility] [address] [place] [address] office: [phone] fax: [phone]       Physical Therapy: TREATMENT/PROGRESS NOTE   Patient: Cammie Jones (64 y.o. female)   Examination Date: 10/17/2024   :  1960 MRN: [id number]   Visit #: 6   Insurance Allowable Auth Needed   [place] 30 pcy [x]Yes    []No    Insurance: Payor: [employer] [place] MEDICAID / Plan: [place] [employer] [place] MED[employer] / Product Type: *No Product type* /   Insurance ID: [id number] - (Medicaid Managed)  Secondary Insurance (if applicable):    Treatment Diagnosis:     ICD-10-CM    1. Pain  R52       2. Stiffness in joint  M25.60       3. Decreased functional mobility  R26.89          Medical Diagnosis:  Pain in both lower extremities [M79.604, M79.605]  Back pain, unspecified back location, unspecified back pain laterality, unspecified chronicity [M54.9]   Referring Physician: Wanda Singh MD  PCP: Wanda Singh MD     Plan of care signed (Y/N): sent     Date of Patient follow up with Physician:      Plan of Care Report: NO  POC update due: (10 visits /OR AUTH LIMITS, whichever is less)  11/15/2024                                             Medical History:  Comorbidities:  Hypertension  Stroke/TIA  Relevant Medical History: Prior [facility] 2022                        SUBJECTIVE EXAMINATION     Seen by Dr. Singh  - referred for lumbar xray .    Patient stated complaint:  Pain in back, both legs and feet.- for the past 1- 2 months -  notes her legs sometimes feel that they lock up on her with cramping.   Pain comes and goes.   Has been better since taking the muscle relaxers.  Legs have been much better, now the pain is settled in the back. Worse with standing, cooking, better with sitting/rest    10/1/24: Patient reports that back has been feeling pretty good and they have been able to do majority of the HEP, however, not all of them due to some leg

## 2025-04-11 ENCOUNTER — HOSPITAL ENCOUNTER (OUTPATIENT)
Dept: WOMENS IMAGING | Age: 65
Discharge: HOME | End: 2025-04-11
Attending: INTERNAL MEDICINE
Payer: MEDICAID

## 2025-04-11 VITALS — WEIGHT: 218 LBS | BODY MASS INDEX: 34.21 KG/M2 | HEIGHT: 67 IN

## 2025-04-11 DIAGNOSIS — Z12.31 OTHER SCREENING MAMMOGRAM: ICD-10-CM

## 2025-04-11 PROCEDURE — 77063 BREAST TOMOSYNTHESIS BI: CPT

## 2025-04-30 ENCOUNTER — HOSPITAL ENCOUNTER (OUTPATIENT)
Dept: ULTRASOUND IMAGING | Age: 65
Discharge: HOME | End: 2025-04-30
Payer: MEDICAID

## 2025-04-30 ENCOUNTER — HOSPITAL ENCOUNTER (OUTPATIENT)
Dept: WOMENS IMAGING | Age: 65
Discharge: HOME | End: 2025-04-30
Payer: MEDICAID

## 2025-04-30 DIAGNOSIS — R92.8 ABNORMAL MAMMOGRAM: ICD-10-CM

## 2025-04-30 DIAGNOSIS — R92.8 ABNORMAL SCREENING MAMMOGRAM: ICD-10-CM

## 2025-04-30 PROCEDURE — 76642 ULTRASOUND BREAST LIMITED: CPT

## 2025-04-30 PROCEDURE — G0279 TOMOSYNTHESIS, MAMMO: HCPCS
